# Patient Record
Sex: MALE | Race: WHITE
[De-identification: names, ages, dates, MRNs, and addresses within clinical notes are randomized per-mention and may not be internally consistent; named-entity substitution may affect disease eponyms.]

---
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
## 2019-04-18 ENCOUNTER — HOSPITAL ENCOUNTER (EMERGENCY)
Dept: HOSPITAL 10 - FTE | Age: 19
Discharge: HOME | End: 2019-04-18
Payer: COMMERCIAL

## 2019-04-18 ENCOUNTER — HOSPITAL ENCOUNTER (EMERGENCY)
Dept: HOSPITAL 91 - FTE | Age: 19
Discharge: HOME | End: 2019-04-18
Payer: COMMERCIAL

## 2019-04-18 VITALS — RESPIRATION RATE: 18 BRPM | HEART RATE: 82 BPM | SYSTOLIC BLOOD PRESSURE: 159 MMHG | DIASTOLIC BLOOD PRESSURE: 91 MMHG

## 2019-04-18 VITALS
HEIGHT: 69 IN | HEIGHT: 69 IN | BODY MASS INDEX: 46.65 KG/M2 | WEIGHT: 315 LBS | WEIGHT: 315 LBS | BODY MASS INDEX: 46.65 KG/M2

## 2019-04-18 DIAGNOSIS — I10: ICD-10-CM

## 2019-04-18 DIAGNOSIS — R20.2: Primary | ICD-10-CM

## 2019-04-18 PROCEDURE — 99283 EMERGENCY DEPT VISIT LOW MDM: CPT

## 2019-04-18 NOTE — ERD
ER Documentation


Chief Complaint


Chief Complaint





C/O LOWER BACK PAIN WITH PAIN GOES TO BOTH LEGS FOR 5 DAYS. STEADY GAIT.





HPI


18-year-old male presenting with lower back pain that goes to both legs.  


Patient states is been going for the last 5 days.  She has no weakness and 


normal urination bowel movement.  Feels that there is a numbness and tingling to


his legs.  Denies any fevers.  Denies other medical problems.  NKDA.  Surgical 


history denies.  Social history denies





ROS


All systems reviewed and are negative except as per history of present illness.





Medications


Home Meds


Active Scripts


Naproxen* (Naprosyn*) 500 Mg Tablet, 500 MG PO BID PRN for PAIN AND/OR 


INFLAMMATION, #30 TAB


   Prov:MELCHOR CONTRERAS PA-C         4/18/19


Methylprednisolone* (Medrol* DOSE PACK) 4 Mg/Dose-Pack Tab.ds.pk, 4 MG PO .AS 


DIRECTED, #1 PACKET


   Prov:MELCHOR CONTRERAS PA-C         4/18/19


Ketotifen Fumarate (ZADITOR) 5 Ml Drops, 5 ML OP BID, #1 BOTTLE


   Prov:BURT BILLINGS PA-C         6/7/17


Loratadine* (Claritin*) 5 Mg Tab.rapdis, 5 MG PO DAILY, #30 TAB


   Prov:BURT BILLINGS PA-C         6/7/17


Fluticasone Propionate (Flonase Allergy Relief) 9.9 Ml [place].susp, 1 SPRAY NASAL


BID, #1 BOTTLE


   TO EACH NOSTRIL


   Prov:BURT BILLINGS PA-C         6/7/17


Mometasone Furoate* (Nasonex*) 50 Mcg/Spray - 17 Gm [place].pump, 1 SPRAY NASAL BI


D, #1 BOTTLE


   IN EACH NOSTRIL


   Prov:RAJENDRA VELARDE PA-C         9/14/16


Loratadine* (Claritin*) 10 Mg Capsule, 10 MG PO DAILY, #30 CAP


   Prov:RAJENDRA VELARDE PA-C         9/14/16


Naproxen* (Naproxen*) 500 Mg Tablet, 500 MG PO BID, #20 TAB


   Prov:ROLO DANIEL DO         3/25/16


Amoxicillin* (Amoxicillin*) 500 Mg Cap, 500 MG PO TID for 10 Days, CAP


   Prov:ROLO DANIEL DO         3/25/16


Oseltamivir Phosphate* (Tamiflu*) 75 Mg Capsule, 75 MG PO BID for 5 Days, CAP


   Prov:CHLOE RAMÍREZ NP         2/7/16


Ibuprofen* (Motrin*) 600 Mg Tab, 600 MG PO Q6H PRN for PAIN AND OR ELEVATED 


TEMP, #30 TAB


   Prov:CHLOE RAMÍREZ NP         2/7/16


Cetirizine Hcl* (Zyrtec*) 1 Mg/Ml Syrup, 10 ML PO DAILY, #4 OZ


   Prov:CHLOE RAMÍREZ NP         2/7/16


Guaifenesin-D-Methorphan Hb* (Guaifenesin* DM Syrup) 120 Ml Syrup, 10 ML PO Q4H 


PRN for COUGH, #120 ML


   Prov:CHLOE RAMÍREZ NP         2/7/16


Reported Medications


[Proair]   No Conflict Check


   10/17/12


Loratadine (Claritin) 10 Mg Tablet, daily prn


   9/10/11





Allergies


Allergies:  


Coded Allergies:  


     No Known Drug Allergy (Verified  Allergy, Mild, 3/25/16)





PMhx/Soc


Medical and Surgical Hx:  pt denies Surgical Hx


History of Surgery:  Yes


Anesthesia Reaction:  No


Hx Neurological Disorder:  No


Hx Respiratory Disorders:  Yes (bronchitis)


Hx Cardiac Disorders:  Yes (htn)


Hx Psychiatric Problems:  Yes (anxiety)


Hx Miscellaneous Medical Probl:  No


Hx Alcohol Use:  No


Hx Substance Use:  No


Hx Tobacco Use:  No


Smoking Status:  Never smoker





FmHx


Family History:  No diabetes, No coronary disease, No other





Physical Exam


Vitals





Vital Signs


  Date      Temp  Pulse  Resp  B/P (MAP)   Pulse Ox  O2          O2 Flow    FiO2


Time                                                 Delivery    Rate


   4/18/19  99.2     82    18      159/91        97


     10:32                          (113)





Physical Exam


GENERAL: The patient is well-appearing, well-nourished, in no acute distress


CHEST: Clear to auscultation bilaterally.  There are no rales, wheezes or 


rhonchi.


HEART: Regular rate and rhythm.  No murmurs, clicks, rubs or gallops.  No S3 or 


S4.


BACK: No midline or flank tenderness.


EXTREMITIES: Equal pulses bilaterally.  There is no peripheral clubbing, 


cyanosis or edema.  No focal swelling or erythema.  Full range of motion.  


Grossly neurovascularly intact.


NEUROLOGIC: Alert and oriented.  Cranial nerves II through XII intact.  Motor 


strength in all 4 extremities with 5 out of 5 strength.  Sensation grossly 


intact.  Normal speech and gait.  Babinski negative.  DTR 2+ throughout.


SKIN: There is no apparent rash or petechiae.  The skin is warm and dry.





Procedures/MDM


MDM: 18-year-old male presenting with paresthesias.  I have low suspicion for 


cauda equina, discitis or epidural abscess.  I have low suspicion for infectious


 etiology.  Patient likely has nerve impingement is discharged with supportive 


medications.  She is told symptoms change or worsen to return immediately to the


 ER.  All questions answered at discharge





Departure


Diagnosis:  


   Primary Impression:  


   Distal paresthesia


Condition:  Stable


Patient Instructions:  Numbness





Additional Instructions:  


FOLLOW UP WITH YOUR PRIMARY CARE PHYSICIAN TOMORROW.Return to this facility if 


you are not improving as expected.











MELCHOR CONTRERAS PA-C       Apr 18, 2019 12:01

## 2019-07-11 ENCOUNTER — HOSPITAL ENCOUNTER (EMERGENCY)
Dept: HOSPITAL 10 - E/R | Age: 19
Discharge: HOME | End: 2019-07-11
Payer: COMMERCIAL

## 2019-07-11 ENCOUNTER — HOSPITAL ENCOUNTER (EMERGENCY)
Dept: HOSPITAL 91 - E/R | Age: 19
Discharge: HOME | End: 2019-07-11
Payer: COMMERCIAL

## 2019-07-11 VITALS — RESPIRATION RATE: 18 BRPM | SYSTOLIC BLOOD PRESSURE: 147 MMHG | HEART RATE: 89 BPM | DIASTOLIC BLOOD PRESSURE: 82 MMHG

## 2019-07-11 VITALS
BODY MASS INDEX: 44.1 KG/M2 | HEIGHT: 71 IN | BODY MASS INDEX: 44.1 KG/M2 | WEIGHT: 315 LBS | WEIGHT: 315 LBS | HEIGHT: 71 IN
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
## 2019-07-11 DIAGNOSIS — I10: ICD-10-CM
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
## 2019-07-11 DIAGNOSIS — S05.91XA: Primary | ICD-10-CM

## 2019-07-11 PROCEDURE — 99282 EMERGENCY DEPT VISIT SF MDM: CPT

## 2019-07-11 NOTE — ERD
ER Documentation


Chief Complaint


Chief Complaint





RIGHT EYE REDNESS SINCE YESTERDAY





HPI


19-year-old male no significant past medical history presents for right eye 


redness x1 day.  He was using some soap and ask any poked his right eye and 


developed the redness.  He denies any pain currently.  Denies any discharge.  No


fevers or chills noted.  No prior similar symptoms.  Denies any itchiness.  No 


watery or pus discharge from the eye.  5 as noted, no other treatments tried at 


home.





ROS


All systems reviewed and are negative except as per history of present illness.





Medications


Home Meds


Active Scripts


Dextran 70/Hypromellose (Artificial Tears) 1 Each [place], 1 EACH OP TID PRN 


for eye irritation for 5 Days, #1 BOTTLE


   Prov:LOURDES PAEZ DO         7/11/19


Naproxen* (Naprosyn*) 500 Mg Tablet, 500 MG PO BID PRN for PAIN AND/OR 


INFLAMMATION, #30 TAB


   Prov:MELCHOR CONTRERAS PA-C         4/18/19


Methylprednisolone* (Medrol* DOSE PACK) 4 Mg/Dose-Pack Tab.ds.pk, 4 MG PO .AS 


DIRECTED, #1 PACKET


   Prov:MELCHOR CONTRERAS PA-C         4/18/19


Ketotifen Fumarate (ZADITOR) 5 Ml Drops, 5 ML OP BID, #1 BOTTLE


   Prov:BURT BILLINGS PA-C         6/7/17


Loratadine* (Claritin*) 5 Mg Tab.rapdis, 5 MG PO DAILY, #30 TAB


   Prov:BURT BILLINGS PA-C         6/7/17


Fluticasone Propionate (Flonase Allergy Relief) 9.9 Ml [place].susp, 1 SPRAY NASAL


BID, #1 BOTTLE


   TO EACH NOSTRIL


   Prov:BURT BILLINGS PA-C         6/7/17


Mometasone Furoate* (Nasonex*) 50 Mcg/Spray - 17 Gm [place].pump, 1 SPRAY NASAL 


BID, #1 BOTTLE


   IN EACH NOSTRIL


   Prov:RAJENDRA VELARDE PA-C         9/14/16


Loratadine* (Claritin*) 10 Mg Capsule, 10 MG PO DAILY, #30 CAP


   Prov:RAJENDRA VELARDE PA-C         9/14/16


Naproxen* (Naproxen*) 500 Mg Tablet, 500 MG PO BID, #20 TAB


   Prov:ROLO DANIEL          3/25/16


Amoxicillin* (Amoxicillin*) 500 Mg Cap, 500 MG PO TID for 10 Days, CAP


   Prov:ROLO DANIEL DO         3/25/16


Oseltamivir Phosphate* (Tamiflu*) 75 Mg Capsule, 75 MG PO BID for 5 Days, CAP


   Prov:CHLOE RAMÍREZ NP         2/7/16


Ibuprofen* (Motrin*) 600 Mg Tab, 600 MG PO Q6H PRN for PAIN AND OR ELEVATED 


TEMP, #30 TAB


   Prov:CHLOE RAMÍREZ NP         2/7/16


Cetirizine Hcl* (Zyrtec*) 1 Mg/Ml Syrup, 10 ML PO DAILY, #4 OZ


   Prov:CHLOE RAMÍREZ NP         2/7/16


Guaifenesin-D-Methorphan Hb* (Guaifenesin* DM Syrup) 120 Ml Syrup, 10 ML PO Q4H 


PRN for COUGH, #120 ML


   Prov:CHLOE RAMÍREZ NP         2/7/16


Reported Medications


[Proair]   No Conflict Check


   10/17/12


Loratadine (Claritin) 10 Mg Tablet, daily prn


   9/10/11





Allergies


Allergies:  


Coded Allergies:  


     No Known Drug Allergy (Verified  Allergy, Mild, 3/25/16)





PMhx/Soc


History of Surgery:  Yes


Anesthesia Reaction:  No


Hx Neurological Disorder:  No


Hx Respiratory Disorders:  Yes (bronchitis)


Hx Cardiac Disorders:  Yes (htn)


Hx Psychiatric Problems:  Yes (anxiety)


Hx Miscellaneous Medical Probl:  No


Hx Alcohol Use:  No


Hx Substance Use:  No


Hx Tobacco Use:  No





FmHx


Family History:  No coronary disease





Physical Exam


Vitals





Vital Signs


  Date      Temp  Pulse  Resp  B/P (MAP)   Pulse Ox  O2          O2 Flow    FiO2


Time                                                 Delivery    Rate


   7/11/19  98.7     89    18      147/82        97


     17:14                          (103)





Physical Exam


Const:   No acute distress


Head:   Atraumatic 


Eyes:    Right eye area of conjunctival redness with clear demarcations, EOMI, 


PERRLA


ENT:    Normal External Ears, Nose and Mouth.


Neck:               Full range of motion. No meningismus.


Resp:   Clear to auscultation bilaterally


Cardio:   Regular rate and rhythm, no murmurs


Skin:   No petechiae or rashes


Ext:    No cyanosis, or edema


Neur:   Awake and alert


Psych:    Normal Mood and Affect





Procedures/MDM


Medical Decision Making:





No evidence of ruptured globe, retinal detachment, acute angle closure glaucoma,


 or deep space infection.





Patient appeared well on physical exam.


His examination insistent with a subconjunctival hemorrhage





Patient given prescription for supportive medication(s).





Advised to get referral from PCP for ophthalmology if the symptoms do not 


resolved.





Patient advised to follow up with PCP in 1-2 days. Patient advised to return to 


ED for new or worsening symptoms. Patient stable on discharge from the ED.








Disclaimer: Inadvertent spelling and grammatical errors are likely due to 


EHR/dictation software use and do not reflect on the overall quality of patient 


care. Also, please note that the electronic time recorded on this note does not 


necessarily reflect the actual time of the patient encounter.





Departure


Diagnosis:  


   Primary Impression:  


   Eye injury


   Encounter type:  initial encounter  Laterality:  right  Qualified Codes:  


   S05.91XA - Unspecified injury of right eye and orbit, initial encounter


Condition:  Fair


Patient Instructions:  Subconjunctival Hemorrhage


Referrals:  


BENJAMIN DAVIS MD (PCP)





Additional Instructions:  


Call your primary care doctor TOMORROW for an appointment during the next 1-2 


days.See the doctor sooner or return here if your condition worsens before your 


appointment time.











LOURDES PAEZ DO                 Jul 11, 2019 17:36

## 2019-09-16 ENCOUNTER — HOSPITAL ENCOUNTER (EMERGENCY)
Dept: HOSPITAL 91 - E/R | Age: 19
Discharge: HOME | End: 2019-09-16
Payer: COMMERCIAL

## 2019-09-16 ENCOUNTER — HOSPITAL ENCOUNTER (EMERGENCY)
Dept: HOSPITAL 10 - E/R | Age: 19
Discharge: HOME | End: 2019-09-16
Payer: COMMERCIAL

## 2019-09-16 VITALS
WEIGHT: 225.31 LBS | WEIGHT: 225.31 LBS | HEIGHT: 70 IN | BODY MASS INDEX: 32.26 KG/M2 | BODY MASS INDEX: 32.26 KG/M2 | HEIGHT: 70 IN

## 2019-09-16 VITALS — DIASTOLIC BLOOD PRESSURE: 93 MMHG | SYSTOLIC BLOOD PRESSURE: 194 MMHG | HEART RATE: 93 BPM | RESPIRATION RATE: 20 BRPM

## 2019-09-16 DIAGNOSIS — H66.002: Primary | ICD-10-CM

## 2019-09-16 DIAGNOSIS — I10: ICD-10-CM

## 2019-09-16 PROCEDURE — 99283 EMERGENCY DEPT VISIT LOW MDM: CPT
